# Patient Record
Sex: FEMALE | Race: BLACK OR AFRICAN AMERICAN | Employment: FULL TIME | URBAN - METROPOLITAN AREA
[De-identification: names, ages, dates, MRNs, and addresses within clinical notes are randomized per-mention and may not be internally consistent; named-entity substitution may affect disease eponyms.]

---

## 2019-12-16 ENCOUNTER — HOSPITAL ENCOUNTER (OUTPATIENT)
Dept: PHYSICAL THERAPY | Age: 69
Discharge: HOME | End: 2019-12-16
Payer: COMMERCIAL

## 2019-12-16 PROCEDURE — 97162 PT EVAL MOD COMPLEX 30 MIN: CPT

## 2019-12-16 PROCEDURE — 97140 MANUAL THERAPY 1/> REGIONS: CPT

## 2019-12-16 PROCEDURE — 97535 SELF CARE MNGMENT TRAINING: CPT

## 2019-12-16 PROCEDURE — 97110 THERAPEUTIC EXERCISES: CPT

## 2019-12-16 NOTE — PROGRESS NOTES
[facility]  [address]  [place], [address]  [phone] fax    Plan of Care/ Statement of Necessity for Physical Therapy Services    Patient name: Bisi Ford Start of Care: 2019   Referral source: Alex Last NP : 1950    Medical Diagnosis: Low back pain [M54.5]  Payor: BLUE CROSS / Plan: Arlen Owen [id number] PPO / Product Type: PPO /  Onset Date:2019    Treatment Diagnosis: low back pain, L4-5 spondylolisthesis, post-L2-3 microdiscectomy on right   Prior Hospitalization: see medical history Provider#: [id number]   Medications: Verified on Patient summary List    Comorbidities: HTN, surgical hx of right L2-3 microdiscectomy, obesity, back pain   Prior Level of Function: ambulates and works with minimal low back pain     The Plan of Care and following information is based on the information from the initial evaluation. Assessment/ key information: Pt is a 82-year-old female with signs and sx consistent with L4-5 spondylolisthesis and post-surgical low back pain following her L2-3 microdiscectomy. She presents with low back pain without radiation of sx, increased erector spinae tonicity on her right side, tenderness of her B piriformis muscles upon palpation, and pain with lumbar extension and right side-bending/rotation. Pain in her low back has impacted her gait mechanics, with the pt assuming a forward stooped posture and slow liza with gait as prolonged standing and walking cause extra pain. There is some generalized weakness noted in her hips, knees, and ankles and decreased flexibility of her glutes, B piriformis, and hip external rotators.      Patient will continue to benefit from skilled PT services to modify and progress therapeutic interventions, address functional mobility deficits, address ROM deficits, address strength deficits, analyze and address soft tissue restrictions, analyze and cue movement patterns, analyze and modify body mechanics/ergonomics, assess and modify postural abnormalities and instruct in home and community integration to attain remaining goals. Evaluation Complexity History MEDIUM  Complexity : 1-2 comorbidities / personal factors will impact the outcome/ POC ; Examination MEDIUM Complexity : 3 Standardized tests and measures addressing body structure, function, activity limitation and / or participation in recreation  ;Presentation MEDIUM Complexity : Evolving with changing characteristics  ; Clinical Decision Making MEDIUM Complexity : FOTO score of 26-74  Overall Complexity Rating: MEDIUM  Problem List: pain affecting function, decrease ROM, decrease strength, decrease ADL/ functional abilitiies, decrease activity tolerance and decrease flexibility/ joint mobility   Treatment Plan may include any combination of the following: Therapeutic exercise, Therapeutic activities, Neuromuscular re-education, Physical agent/modality, Gait/balance training, Manual therapy, Aquatic therapy, Patient education, Self Care training, Functional mobility training and Stair training  Patient / Family readiness to learn indicated by: asking questions, trying to perform skills and interest  Persons(s) to be included in education: patient (P)  Barriers to Learning/Limitations: None  Patient Goal (s): to get back to my regular self  Patient Self Reported Health Status: good  Rehabilitation Potential: good    Short Term Goals: To be accomplished in 2 weeks:   1. Pt will report compliance with HEP to maximize therapeutic benefit. 2. Pt will report decreased pain in her low back to 2/10 or less to increase her confidence with performing prolonged work shifts. 3. Pt will tolerate full 30-45 minute therapy session w/o complaints of increased pain or fatigue for two consecutive sessions to progress towards her goal of increasing work stamina. Long Term Goals: To be accomplished in 4 weeks:   1.  Pt will increase FOTO score to 61, demonstrating improved functional outcomes. 2. Pt will increase strength of hip flexors and extensors to 4+/5 or better bilaterally to improve activity tolerance. 3. Pt will increase strength of her bilateral ankle dorsiflexors and plantarflexors to improve ambulation tolerance for work. 4. Pt will increase hip IR and ER strength to 4+/5 or better to improve balance and progress her towards meeting her goals of improved activity tolerance. 5. Pt will demonstrate improved core strength by performing an abdominal crunch for 10-15 seconds to progress her towards her goal of returning to exercise with friends. 6. Pt will report pain of less than 2/10 in her low back at rest to assist her with completing ADLs and a full work day. Frequency / Duration: Patient to be seen 2 times per week for 6 weeks. Patient/ Caregiver education and instruction: Diagnosis, prognosis, self care and exercises   [x]  Plan of care has been reviewed with OSMEL Albright PT 12/16/2019 3:12 PM    ________________________________________________________________________    I certify that the above Therapy Services are being furnished while the patient is under my care. I agree with the treatment plan and certify that this therapy is necessary.     Physician's Signature:____________Date:_________TIME:________    ** Signature, Date and Time must be completed for valid certification **    Please sign and return to [facility] [address]  [address]  [place], [address]  [phone] fax
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
## 2020-01-17 NOTE — PROGRESS NOTES
[facility]  [address]  [place], [address]  [phone] fax    Physical Therapy Discharge Summary  Patient name: Alvarez Acuna Start of Care: 2019   Referral source: Karrie Tang NP : 1950   Medical/Treatment Diagnosis: Low back pain [M54.5]  Payor: BLUE CROSS / Plan: Arlen Owen [id number] PPO / Product Type: PPO /  Onset Date:2019     Prior Hospitalization: see medical history Provider#: [id number]   Medications: Verified on Patient Summary List    Comorbidities: HTN, surgical hx of right L2-3 microdiscectomy, obesity, back pain  Prior Level of Function:ambulates and works with minimal low back pain  Visits from Start of Care: 1    Missed Visits: 0  Reporting Period : 2019 to 2020      Summary of Care:  1. Pt will report compliance with HEP to maximize therapeutic benefit. IE: issued   Discharge: not met secondary to no follow-up visits    2. Pt will report decreased pain in her low back to 2/10 or less to increase her confidence with performing prolonged work shifts. IE: 3/10   Discharge: not met secondary to no follow-up visits    3. Pt will tolerate full 30-45 minute therapy session w/o complaints of increased pain or fatigue for two consecutive sessions to progress towards her goal of increasing work stamina. IE: not yet assessed   Discharge: not met secondary to no follow-up visits    4. Pt will increase FOTO score to 61, demonstrating improved functional outcomes. IE: 48   Discharge: not met secondary to no follow-up visits    5. Pt will increase strength of hip flexors and extensors to 4+/5 or better bilaterally to improve activity tolerance. IE: 4/5   Discharge: not met secondary to no follow-up visits    6.  Pt will increase strength of her bilateral ankle dorsiflexors and plantarflexors to 4+/5 or betterimprove ambulation tolerance for work. IE: DF 4/5, PF 4+/5   Discharge: not met secondary to no follow-up visits    7. Pt will increase hip IR and ER strength to 4+/5 or better to improve balance and progress her towards meeting her goals of improved activity tolerance. IE: IR 4-/5, ER 4/5   Discharge: not met secondary to no follow-up visits    8. Pt will demonstrate improved core strength by performing an abdominal crunch for 10-15 seconds to progress her towards her goal of returning to exercise with friends. IE: <5 seconds   Discharge: not met secondary to no follow-up visits    9. Pt will report pain of less than 2/10 in her low back at rest to assist her with completing ADLs and a full work day. IE: 3/10   Discharge: not met secondary to no follow-up visits    ASSESSMENT/RECOMMENDATIONS:   Pt was seen for an evaluation on 12/16/2019 and has not since returned for any follow-up visits. Pt will be discharged at this time secondary to lack of attendance.     [x]Discontinue therapy: []Patient has reached or is progressing toward set goals      [x]Patient is non-compliant or has abdicated      []Due to lack of appreciable progress towards set goals    Dayday Burch, PT 1/17/2020 4:10 PM

## 2024-12-18 ENCOUNTER — APPOINTMENT (OUTPATIENT)
Facility: HOSPITAL | Age: 74
End: 2024-12-18
Payer: COMMERCIAL

## 2024-12-18 ENCOUNTER — HOSPITAL ENCOUNTER (EMERGENCY)
Facility: HOSPITAL | Age: 74
Discharge: HOME | End: 2024-12-18
Payer: COMMERCIAL

## 2024-12-18 VITALS
DIASTOLIC BLOOD PRESSURE: 95 MMHG | OXYGEN SATURATION: 100 % | HEIGHT: 65 IN | HEART RATE: 89 BPM | TEMPERATURE: 98.8 F | RESPIRATION RATE: 18 BRPM | SYSTOLIC BLOOD PRESSURE: 114 MMHG | WEIGHT: 230 LBS | BODY MASS INDEX: 38.32 KG/M2
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
## 2024-12-18 DIAGNOSIS — R93.89 ABNORMAL CT SCAN: ICD-10-CM

## 2024-12-18 DIAGNOSIS — E87.6 HYPOKALEMIA: ICD-10-CM

## 2024-12-18 LAB
ANION GAP SERPL CALC-SCNC: 5 MMOL/L (ref 3–18)
BASOPHILS # BLD: 0 K/UL (ref 0–0.1)
BASOPHILS NFR BLD: 0 % (ref 0–2)
BUN SERPL-MCNC: 26 MG/DL (ref 7–18)
BUN/CREAT SERPL: 24 (ref 12–20)
CALCIUM SERPL-MCNC: 9.5 MG/DL (ref 8.5–10.1)
CHLORIDE SERPL-SCNC: 99 MMOL/L (ref 100–111)
CO2 SERPL-SCNC: 36 MMOL/L (ref 21–32)
CREAT SERPL-MCNC: 1.09 MG/DL (ref 0.6–1.3)
DIFFERENTIAL METHOD BLD: ABNORMAL
EOSINOPHIL # BLD: 0.1 K/UL (ref 0–0.4)
EOSINOPHIL NFR BLD: 2 % (ref 0–5)
ERYTHROCYTE [DISTWIDTH] IN BLOOD BY AUTOMATED COUNT: 12.8 % (ref 11.6–14.5)
GLUCOSE SERPL-MCNC: 110 MG/DL (ref 74–99)
HCT VFR BLD AUTO: 42.6 % (ref 35–45)
HGB BLD-MCNC: 14.6 G/DL (ref 12–16)
IMM GRANULOCYTES # BLD AUTO: 0.1 K/UL (ref 0–0.04)
IMM GRANULOCYTES NFR BLD AUTO: 1 % (ref 0–0.5)
LYMPHOCYTES # BLD: 2.2 K/UL (ref 0.9–3.6)
LYMPHOCYTES NFR BLD: 31 % (ref 21–52)
MCH RBC QN AUTO: 30.2 PG (ref 24–34)
MCHC RBC AUTO-ENTMCNC: 34.3 G/DL (ref 31–37)
MCV RBC AUTO: 88 FL (ref 78–100)
MONOCYTES # BLD: 0.6 K/UL (ref 0.05–1.2)
MONOCYTES NFR BLD: 8 % (ref 3–10)
NEUTS SEG # BLD: 4.2 K/UL (ref 1.8–8)
NEUTS SEG NFR BLD: 59 % (ref 40–73)
NRBC # BLD: 0 K/UL (ref 0–0.01)
NRBC BLD-RTO: 0 PER 100 WBC
PLATELET # BLD AUTO: 181 K/UL (ref 135–420)
PMV BLD AUTO: 11.2 FL (ref 9.2–11.8)
POTASSIUM SERPL-SCNC: 3.3 MMOL/L (ref 3.5–5.5)
RBC # BLD AUTO: 4.84 M/UL (ref 4.2–5.3)
SODIUM SERPL-SCNC: 140 MMOL/L (ref 136–145)
WBC # BLD AUTO: 7.2 K/UL (ref 4.6–13.2)

## 2024-12-18 PROCEDURE — 6370000000 HC RX 637 (ALT 250 FOR IP): Performed by: PHYSICIAN ASSISTANT

## 2024-12-18 PROCEDURE — 99285 EMERGENCY DEPT VISIT HI MDM: CPT

## 2024-12-18 PROCEDURE — 70450 CT HEAD/BRAIN W/O DYE: CPT

## 2024-12-18 PROCEDURE — 80048 BASIC METABOLIC PNL TOTAL CA: CPT

## 2024-12-18 PROCEDURE — 85025 COMPLETE CBC W/AUTO DIFF WBC: CPT

## 2024-12-18 PROCEDURE — 72125 CT NECK SPINE W/O DYE: CPT

## 2024-12-18 PROCEDURE — 6360000004 HC RX CONTRAST MEDICATION: Performed by: PHYSICIAN ASSISTANT

## 2024-12-18 PROCEDURE — 71260 CT THORAX DX C+: CPT

## 2024-12-18 RX ORDER — LIDOCAINE 50 MG/G
1 PATCH TOPICAL DAILY
Qty: 10 PATCH | Refills: 0 | Status: SHIPPED | OUTPATIENT
Start: 2024-12-18 | End: 2024-12-28

## 2024-12-18 RX ORDER — IOPAMIDOL 612 MG/ML
85 INJECTION, SOLUTION INTRAVASCULAR
Status: COMPLETED | OUTPATIENT
Start: 2024-12-18 | End: 2024-12-18

## 2024-12-18 RX ORDER — ACETAMINOPHEN 325 MG/1
650 TABLET ORAL EVERY 6 HOURS PRN
Qty: 120 TABLET | Refills: 3 | Status: SHIPPED | OUTPATIENT
Start: 2024-12-18

## 2024-12-18 RX ORDER — CYCLOBENZAPRINE HCL 10 MG
10 TABLET ORAL 3 TIMES DAILY PRN
Qty: 21 TABLET | Refills: 0 | Status: SHIPPED | OUTPATIENT
Start: 2024-12-18 | End: 2024-12-28

## 2024-12-18 RX ADMIN — IOPAMIDOL 85 ML: 612 INJECTION, SOLUTION INTRAVENOUS at 20:04

## 2024-12-18 RX ADMIN — POTASSIUM BICARBONATE 40 MEQ: 782 TABLET, EFFERVESCENT ORAL at 21:36
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
## 2024-12-19 ASSESSMENT — ENCOUNTER SYMPTOMS
ABDOMINAL DISTENTION: 0
DIARRHEA: 0
SORE THROAT: 0
CONSTIPATION: 0
NAUSEA: 0
WHEEZING: 0
SHORTNESS OF BREATH: 0
VOMITING: 0
EYE DISCHARGE: 0

## 2024-12-19 NOTE — ED PROVIDER NOTES
sounds.   Abdominal:      General: There is no distension.      Tenderness: There is no abdominal tenderness.   Musculoskeletal:         General: Normal range of motion.      Cervical back: Normal range of motion.   Neurological:      General: No focal deficit present.      Mental Status: She is alert and oriented to person, place, and time. Mental status is at baseline.      GCS: GCS eye subscore is 4. GCS verbal subscore is 5. GCS motor subscore is 6.      Cranial Nerves: Cranial nerves 2-12 are intact.      Sensory: Sensation is intact.      Motor: Motor function is intact.      Coordination: Coordination is intact.      Gait: Gait is intact.   Psychiatric:         Mood and Affect: Affect is tearful.         Thought Content: Thought content normal.           Diagnostic Study Results     Labs -     Recent Results (from the past 12 hour(s))   BMP    Collection Time: 12/18/24  5:55 PM   Result Value Ref Range    Sodium 140 136 - 145 mmol/L    Potassium 3.3 (L) 3.5 - 5.5 mmol/L    Chloride 99 (L) 100 - 111 mmol/L    CO2 36 (H) 21 - 32 mmol/L    Anion Gap 5 3.0 - 18 mmol/L    Glucose 110 (H) 74 - 99 mg/dL    BUN 26 (H) 7.0 - 18 MG/DL    Creatinine 1.09 0.6 - 1.3 MG/DL    BUN/Creatinine Ratio 24 (H) 12 - 20      Est, Glom Filt Rate 54 (L) >60 ml/min/1.73m2    Calcium 9.5 8.5 - 10.1 MG/DL   CBC with Auto Differential    Collection Time: 12/18/24  5:55 PM   Result Value Ref Range    WBC 7.2 4.6 - 13.2 K/uL    RBC 4.84 4.20 - 5.30 M/uL    Hemoglobin 14.6 12.0 - 16.0 g/dL    Hematocrit 42.6 35.0 - 45.0 %    MCV 88.0 78.0 - 100.0 FL    MCH 30.2 24.0 - 34.0 PG    MCHC 34.3 31.0 - 37.0 g/dL    RDW 12.8 11.6 - 14.5 %    Platelets 181 135 - 420 K/uL    MPV 11.2 9.2 - 11.8 FL    Nucleated RBCs 0.0 0  WBC    nRBC 0.00 0.00 - 0.01 K/uL    Neutrophils % 59 40 - 73 %    Lymphocytes % 31 21 - 52 %    Monocytes % 8 3 - 10 %    Eosinophils % 2 0 - 5 %    Basophils % 0 0 - 2 %    Immature Granulocytes % 1 (H) 0.0 - 0.5 %